# Patient Record
Sex: MALE | Race: WHITE | NOT HISPANIC OR LATINO | URBAN - METROPOLITAN AREA
[De-identification: names, ages, dates, MRNs, and addresses within clinical notes are randomized per-mention and may not be internally consistent; named-entity substitution may affect disease eponyms.]

---

## 2019-04-23 PROBLEM — 111359004 DIVERTICULITIS OF COLON: Status: ACTIVE | Noted: 2019-04-23

## 2019-04-23 PROBLEM — 274774002: Status: ACTIVE | Noted: 2019-04-23

## 2019-04-23 PROBLEM — 397881000: Status: ACTIVE | Noted: 2019-04-23

## 2019-04-23 PROBLEM — 266464001 HEMORRHAGE OF RECTUM AND ANUS: Status: ACTIVE | Noted: 2019-04-23

## 2019-04-23 PROBLEM — 129851009: Status: ACTIVE | Noted: 2019-04-23

## 2019-04-23 PROBLEM — 197321007 FATTY LIVER: Status: ACTIVE | Noted: 2019-04-23

## 2020-12-16 ENCOUNTER — TELEPHONE ENCOUNTER (OUTPATIENT)
Dept: URBAN - METROPOLITAN AREA CLINIC 35 | Facility: CLINIC | Age: 50
End: 2020-12-16

## 2023-06-22 ENCOUNTER — APPOINTMENT (OUTPATIENT)
Dept: URBAN - METROPOLITAN AREA CLINIC 208 | Age: 53
Setting detail: DERMATOLOGY
End: 2023-06-27

## 2023-06-22 DIAGNOSIS — L72.8 OTHER FOLLICULAR CYSTS OF THE SKIN AND SUBCUTANEOUS TISSUE: ICD-10-CM

## 2023-06-22 PROCEDURE — OTHER COUNSELING: OTHER

## 2023-06-22 PROCEDURE — OTHER ORDER TESTS: OTHER

## 2023-06-22 PROCEDURE — OTHER MIPS QUALITY: OTHER

## 2023-06-22 PROCEDURE — 10060 I&D ABSCESS SIMPLE/SINGLE: CPT

## 2023-06-22 PROCEDURE — OTHER INCISION AND DRAINAGE: OTHER

## 2023-06-22 PROCEDURE — OTHER PRESCRIPTION: OTHER

## 2023-06-22 RX ORDER — DOXYCYCLINE HYCLATE 100 MG/1
CAPSULE, GELATIN COATED ORAL
Qty: 20 | Refills: 0 | Status: ERX | COMMUNITY
Start: 2023-06-22

## 2023-06-22 ASSESSMENT — LOCATION ZONE DERM
LOCATION ZONE: SCALP
LOCATION ZONE: NECK

## 2023-06-22 ASSESSMENT — LOCATION SIMPLE DESCRIPTION DERM
LOCATION SIMPLE: SCALP
LOCATION SIMPLE: NECK

## 2023-06-22 ASSESSMENT — LOCATION DETAILED DESCRIPTION DERM
LOCATION DETAILED: RIGHT INFERIOR POSTAURICULAR SKIN
LOCATION DETAILED: RIGHT SUPERIOR LATERAL NECK

## 2023-06-22 NOTE — PROCEDURE: INCISION AND DRAINAGE
Detail Level: Detailed
Lesion Type: Abscess
Method: 11 blade
Curette: No
Anesthesia Type: 2% lidocaine with epinephrine and a 1:10 solution of 8.4% sodium bicarbonate
Anesthesia Volume In Cc: 2
Size Of Lesion In Cm (Optional But May Be Required For Some Insurances): 0
Wound Care: Mupirocin
Dressing: no dressing
Epidermal Sutures: 4-0 Ethilon
Epidermal Closure: simple interrupted
Suture Text: The incision was partially closed with
Preparation Text: The area was prepped in the usual clean fashion.
Curette Text (Optional): After the contents were expressed a curette was used to partially remove the cyst wall.
Consent was obtained and risks were reviewed including but not limited to delayed wound healing, infection, need for multiple I and D's, and pain.
Post-Care Instructions: I reviewed with the patient in detail post-care instructions. Patient should keep wound covered and call the office should any redness, pain, swelling or worsening occur.

## 2023-06-22 NOTE — PROCEDURE: ORDER TESTS
Expected Date Of Service: 06/22/2023
Billing Type: Third-Party Bill
Performing Laboratory: [id number]
Bill For Surgical Tray: no

## 2023-06-22 NOTE — HPI: CYST
How Severe Is Your Cyst?: moderate
Is This A New Presentation, Or A Follow-Up?: Cyst
Additional History: Patient had a quiet bump there and this week became red, inflamed and very large and painful

## 2023-08-22 ENCOUNTER — APPOINTMENT (OUTPATIENT)
Dept: URBAN - METROPOLITAN AREA CLINIC 208 | Age: 53
Setting detail: DERMATOLOGY
End: 2023-08-24

## 2023-08-22 DIAGNOSIS — L72.8 OTHER FOLLICULAR CYSTS OF THE SKIN AND SUBCUTANEOUS TISSUE: ICD-10-CM

## 2023-08-22 PROCEDURE — 11900 INJECT SKIN LESIONS </W 7: CPT

## 2023-08-22 PROCEDURE — OTHER ADDITIONAL NOTES: OTHER

## 2023-08-22 PROCEDURE — 10060 I&D ABSCESS SIMPLE/SINGLE: CPT

## 2023-08-22 PROCEDURE — OTHER INTRALESIONAL KENALOG: OTHER

## 2023-08-22 PROCEDURE — OTHER INCISION AND DRAINAGE: OTHER

## 2023-08-22 PROCEDURE — OTHER MIPS QUALITY: OTHER

## 2023-08-22 ASSESSMENT — LOCATION SIMPLE DESCRIPTION DERM: LOCATION SIMPLE: POSTERIOR NECK

## 2023-08-22 ASSESSMENT — LOCATION DETAILED DESCRIPTION DERM: LOCATION DETAILED: RIGHT SUPERIOR POSTERIOR NECK

## 2023-08-22 ASSESSMENT — LOCATION ZONE DERM: LOCATION ZONE: NECK

## 2023-08-22 NOTE — PROCEDURE: INTRALESIONAL KENALOG
How Many Mls Were Removed From The 10 Mg/Ml (5ml) Vial When Preparing The Injectable Solution?: 0
Administered By (Optional): Anali
Show Inventory Tab: Hide
Kenalog Preparation: Kenalog
Medical Necessity Clause: This procedure was medically necessary because the lesions that were treated were:
Kenalog Type Of Vial: Multiple Dose
Require Ndc Code?: No
Validate Note Data When Using Inventory: Yes
Concentration Of Solution Injected (Mg/Ml): 10.0
Detail Level: Zone
Total Volume Injected (Ccs- Only Use Numbers And Decimals): 0.4
Consent: The risks of atrophy were reviewed with the patient.

## 2023-08-22 NOTE — PROCEDURE: ADDITIONAL NOTES
Additional Notes: Send a photo in a month of the cyst on your neck to [email or web address] on removal with Dr. Rios if Cyst persists. Additional Notes: Send a photo in a month of the cyst on your neck to [email or web address] on removal with Dr. Rios if Cyst persists.

## 2025-06-04 ENCOUNTER — WEB ENCOUNTER (OUTPATIENT)
Dept: URBAN - METROPOLITAN AREA CLINIC 23 | Facility: CLINIC | Age: 55
End: 2025-06-04

## 2025-06-06 ENCOUNTER — LAB OUTSIDE AN ENCOUNTER (OUTPATIENT)
Dept: URBAN - METROPOLITAN AREA CLINIC 23 | Facility: CLINIC | Age: 55
End: 2025-06-06

## 2025-06-06 ENCOUNTER — OFFICE VISIT (OUTPATIENT)
Dept: URBAN - METROPOLITAN AREA CLINIC 23 | Facility: CLINIC | Age: 55
End: 2025-06-06
Payer: COMMERCIAL

## 2025-06-06 ENCOUNTER — DASHBOARD ENCOUNTERS (OUTPATIENT)
Age: 55
End: 2025-06-06

## 2025-06-06 DIAGNOSIS — Z12.11 COLON CANCER SCREENING: ICD-10-CM

## 2025-06-06 PROCEDURE — 99202 OFFICE O/P NEW SF 15 MIN: CPT | Performed by: INTERNAL MEDICINE

## 2025-06-06 RX ORDER — ROSUVASTATIN CALCIUM 40 MG/1
TAKE ONE TABLET BY MOUTH ONE TIME DAILY TABLET, FILM COATED ORAL
Qty: 30 UNSPECIFIED | Refills: 0 | Status: ACTIVE | COMMUNITY

## 2025-06-06 RX ORDER — LISINOPRIL 30 MG/1
TAKE ONE TABLET BY MOUTH ONE TIME DAILY TABLET ORAL
Qty: 30 UNSPECIFIED | Refills: 1 | Status: ACTIVE | COMMUNITY

## 2025-06-06 NOTE — HPI-TODAY'S VISIT:
Previous colon cancer screening/colonoscopy: No. Family history of colon cancer: No. Blood in stool: No. Weight loss: No. Change in bowel habit: No. Constipation: No  History of hospitalizations for severe diverticulitis 2019.  No further episodes since then.

## 2025-07-09 ENCOUNTER — TELEPHONE ENCOUNTER (OUTPATIENT)
Dept: URBAN - METROPOLITAN AREA CLINIC 23 | Facility: CLINIC | Age: 55
End: 2025-07-09

## 2025-07-11 ENCOUNTER — OFFICE VISIT (OUTPATIENT)
Dept: URBAN - METROPOLITAN AREA SURGERY CENTER 15 | Facility: SURGERY CENTER | Age: 55
End: 2025-07-11

## 2025-08-01 ENCOUNTER — OFFICE VISIT (OUTPATIENT)
Dept: URBAN - METROPOLITAN AREA CLINIC 23 | Facility: CLINIC | Age: 55
End: 2025-08-01

## 2025-08-05 ENCOUNTER — TELEPHONE ENCOUNTER (OUTPATIENT)
Dept: URBAN - METROPOLITAN AREA CLINIC 23 | Facility: CLINIC | Age: 55
End: 2025-08-05

## 2025-08-12 ENCOUNTER — CLAIMS CREATED FROM THE CLAIM WINDOW (OUTPATIENT)
Dept: URBAN - METROPOLITAN AREA SURGERY CENTER 8 | Facility: SURGERY CENTER | Age: 55
End: 2025-08-12
Payer: COMMERCIAL

## 2025-08-12 ENCOUNTER — CLAIMS CREATED FROM THE CLAIM WINDOW (OUTPATIENT)
Dept: URBAN - METROPOLITAN AREA CLINIC 4 | Facility: CLINIC | Age: 55
End: 2025-08-12
Payer: COMMERCIAL

## 2025-08-12 DIAGNOSIS — K63.5 BENIGN COLON POLYPS: ICD-10-CM

## 2025-08-12 DIAGNOSIS — D12.4 BENIGN NEOPLASM OF DESCENDING COLON: ICD-10-CM

## 2025-08-12 DIAGNOSIS — Z12.11 COLON CANCER SCREENING: ICD-10-CM

## 2025-08-12 DIAGNOSIS — D12.4 ADENOMA OF DESCENDING COLON: ICD-10-CM

## 2025-08-12 PROCEDURE — 00812 ANES LWR INTST SCR COLSC: CPT | Performed by: NURSE ANESTHETIST, CERTIFIED REGISTERED

## 2025-08-12 PROCEDURE — 45385 COLONOSCOPY W/LESION REMOVAL: CPT | Performed by: INTERNAL MEDICINE

## 2025-08-12 PROCEDURE — 88305 TISSUE EXAM BY PATHOLOGIST: CPT | Performed by: PATHOLOGY

## 2025-08-12 RX ORDER — LISINOPRIL 30 MG/1
TAKE ONE TABLET BY MOUTH ONE TIME DAILY TABLET ORAL
Qty: 30 UNSPECIFIED | Refills: 1 | Status: ACTIVE | COMMUNITY

## 2025-08-12 RX ORDER — ROSUVASTATIN CALCIUM 40 MG/1
TAKE ONE TABLET BY MOUTH ONE TIME DAILY TABLET, FILM COATED ORAL
Qty: 30 UNSPECIFIED | Refills: 0 | Status: ACTIVE | COMMUNITY

## 2025-08-26 ENCOUNTER — OFFICE VISIT (OUTPATIENT)
Dept: URBAN - METROPOLITAN AREA SURGERY CENTER 8 | Facility: SURGERY CENTER | Age: 55
End: 2025-08-26